# Patient Record
Sex: FEMALE | Race: AMERICAN INDIAN OR ALASKA NATIVE
[De-identification: names, ages, dates, MRNs, and addresses within clinical notes are randomized per-mention and may not be internally consistent; named-entity substitution may affect disease eponyms.]

---

## 2021-04-21 ENCOUNTER — HOSPITAL ENCOUNTER (EMERGENCY)
Dept: HOSPITAL 5 - ED | Age: 71
Discharge: HOME | End: 2021-04-21
Payer: MEDICARE

## 2021-04-21 DIAGNOSIS — Z79.899: ICD-10-CM

## 2021-04-21 DIAGNOSIS — R52: ICD-10-CM

## 2021-04-21 DIAGNOSIS — R06.02: Primary | ICD-10-CM

## 2021-04-21 DIAGNOSIS — Z88.0: ICD-10-CM

## 2021-04-21 DIAGNOSIS — T50.B95A: ICD-10-CM

## 2021-04-21 DIAGNOSIS — I10: ICD-10-CM
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
## 2021-04-21 DIAGNOSIS — R50.9: ICD-10-CM

## 2021-04-21 LAB
BAND NEUTROPHILS # (MANUAL): 0.3 K/MM3
BUN SERPL-MCNC: 17 MG/DL (ref 7–17)
BUN/CREAT SERPL: 11 %
CALCIUM SERPL-MCNC: 9.1 MG/DL (ref 8.4–10.2)
HCT VFR BLD CALC: 34.4 % (ref 30.3–42.9)
HEMOLYSIS INDEX: 13
HGB BLD-MCNC: 11.9 GM/DL (ref 10.1–14.3)
MCHC RBC AUTO-ENTMCNC: 35 % (ref 30–34)
MCV RBC AUTO: 99 FL (ref 79–97)
MYELOCYTES # (MANUAL): 0 K/MM3
PLATELET # BLD: 208 K/MM3 (ref 140–440)
PROMYELOCYTES # (MANUAL): 0 K/MM3
RBC # BLD AUTO: 3.47 M/MM3 (ref 3.65–5.03)
TOTAL CELLS COUNTED BLD: 100

## 2021-04-21 PROCEDURE — 85007 BL SMEAR W/DIFF WBC COUNT: CPT

## 2021-04-21 PROCEDURE — 36415 COLL VENOUS BLD VENIPUNCTURE: CPT

## 2021-04-21 PROCEDURE — 85025 COMPLETE CBC W/AUTO DIFF WBC: CPT

## 2021-04-21 PROCEDURE — 84484 ASSAY OF TROPONIN QUANT: CPT

## 2021-04-21 PROCEDURE — 83880 ASSAY OF NATRIURETIC PEPTIDE: CPT

## 2021-04-21 PROCEDURE — 71045 X-RAY EXAM CHEST 1 VIEW: CPT

## 2021-04-21 PROCEDURE — 80048 BASIC METABOLIC PNL TOTAL CA: CPT

## 2021-04-21 PROCEDURE — 93005 ELECTROCARDIOGRAM TRACING: CPT
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
## 2021-04-22 NOTE — ELECTROCARDIOGRAPH REPORT
[facility]

                                       

Test Date:    2021               Test Time:    16:58:35

Pat Name:     JOSEP VO           Department:   

Patient ID:   [id number]          Room:          

Gender:       F                        Technician:   SC

:          1950               Requested By: VIJAY ALAS

Order Number: [id number]              Reading MD:   Kai Yo

                                 Measurements

Intervals                              Axis          

Rate:         64                       P:            67

MT:           157                      QRS:          -2

QRSD:         106                      T:            -62

QT:           380                                    

QTc:          393                                    

                           Interpretive Statements

Sinus rhythm

Low voltage, precordial leads

Nonspecific T abnormalities, diffuse leads

Compared to ECG 2021 14:25:36

Low QRS voltage now present

T-wave abnormality now present

Electronically Signed On 2021 9:37:46 EDT by Kai Yo

## 2021-04-22 NOTE — ELECTROCARDIOGRAPH REPORT
[facility]

                                       

Test Date:    2021               Test Time:    14:25:36

Pat Name:     JOSEP VO           Department:   

Patient ID:   [id number]          Room:          

Gender:       F                        Technician:   CAROL

:          1950               Requested By: VIJAY ALAS

Order Number: [id number]              Reading MD:   Kai Yo

                                 Measurements

Intervals                              Axis          

Rate:         72                       P:            71

MS:           145                      QRS:          -13

QRSD:         98                       T:            -24

QT:           366                                    

QTc:          402                                    

                           Interpretive Statements

Sinus rhythm

No previous ECG available for comparison

Electronically Signed On 2021 9:37:34 EDT by Kai Yo